# Patient Record
Sex: FEMALE | Race: WHITE | NOT HISPANIC OR LATINO | Employment: FULL TIME | URBAN - NONMETROPOLITAN AREA
[De-identification: names, ages, dates, MRNs, and addresses within clinical notes are randomized per-mention and may not be internally consistent; named-entity substitution may affect disease eponyms.]

---

## 2023-12-04 ENCOUNTER — HOSPITAL ENCOUNTER (OUTPATIENT)
Dept: MRI IMAGING | Facility: HOSPITAL | Age: 28
Discharge: HOME | End: 2023-12-04
Admitting: NURSE PRACTITIONER
Payer: COMMERCIAL

## 2023-12-04 DIAGNOSIS — G43.009 MIGRAINE WITHOUT AURA, NOT INTRACTABLE, WITHOUT STATUS MIGRAINOSUS: ICD-10-CM

## 2023-12-04 PROCEDURE — 70551 MRI BRAIN STEM W/O DYE: CPT

## 2024-01-02 ENCOUNTER — TELEPHONE (OUTPATIENT)
Dept: NEUROLOGY | Facility: CLINIC | Age: 29
End: 2024-01-02
Payer: COMMERCIAL

## 2024-01-03 ENCOUNTER — OFFICE VISIT (OUTPATIENT)
Dept: NEUROLOGY | Facility: CLINIC | Age: 29
End: 2024-01-03
Payer: COMMERCIAL

## 2024-01-03 VITALS
HEIGHT: 63 IN | OXYGEN SATURATION: 97 % | DIASTOLIC BLOOD PRESSURE: 82 MMHG | SYSTOLIC BLOOD PRESSURE: 138 MMHG | WEIGHT: 147 LBS | BODY MASS INDEX: 26.05 KG/M2 | HEART RATE: 93 BPM

## 2024-01-03 DIAGNOSIS — M54.2 NECK PAIN: ICD-10-CM

## 2024-01-03 DIAGNOSIS — G43.109 MIGRAINE WITH AURA AND WITHOUT STATUS MIGRAINOSUS, NOT INTRACTABLE: Primary | ICD-10-CM

## 2024-01-03 DIAGNOSIS — Z87.828 HISTORY OF NECK INJURY: ICD-10-CM

## 2024-01-03 PROCEDURE — 1159F MED LIST DOCD IN RCRD: CPT | Performed by: NURSE PRACTITIONER

## 2024-01-03 PROCEDURE — 1160F RVW MEDS BY RX/DR IN RCRD: CPT | Performed by: NURSE PRACTITIONER

## 2024-01-03 PROCEDURE — 99214 OFFICE O/P EST MOD 30 MIN: CPT | Performed by: NURSE PRACTITIONER

## 2024-01-03 RX ORDER — RIZATRIPTAN BENZOATE 10 MG/1
10 TABLET, ORALLY DISINTEGRATING ORAL AS NEEDED
Qty: 10 TABLET | Refills: 5 | Status: SHIPPED | OUTPATIENT
Start: 2024-01-03

## 2024-01-03 RX ORDER — OMEPRAZOLE 20 MG/1
20 CAPSULE, DELAYED RELEASE ORAL DAILY
COMMUNITY
Start: 2023-10-10

## 2024-01-03 RX ORDER — TOPIRAMATE 50 MG/1
50 TABLET, FILM COATED ORAL 2 TIMES DAILY
Qty: 60 TABLET | Refills: 3 | Status: SHIPPED | OUTPATIENT
Start: 2024-01-03

## 2024-01-03 RX ORDER — DEXTROMETHORPHAN HYDROBROMIDE, BUPROPION HYDROCHLORIDE 105; 45 MG/1; MG/1
1 TABLET, MULTILAYER, EXTENDED RELEASE ORAL NIGHTLY
COMMUNITY
Start: 2023-11-20

## 2024-01-03 RX ORDER — BUSPIRONE HYDROCHLORIDE 15 MG/1
15 TABLET ORAL AS NEEDED
COMMUNITY
Start: 2023-11-30

## 2024-01-03 RX ORDER — RIZATRIPTAN BENZOATE 10 MG/1
10 TABLET, ORALLY DISINTEGRATING ORAL AS NEEDED
COMMUNITY
Start: 2023-10-12 | End: 2024-01-03 | Stop reason: SDUPTHER
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
## 2024-01-17 ENCOUNTER — TELEPHONE (OUTPATIENT)
Dept: NEUROLOGY | Facility: CLINIC | Age: 29
End: 2024-01-17
Payer: COMMERCIAL

## 2024-01-17 NOTE — TELEPHONE ENCOUNTER
Lm on PT's VM letting her know that her insurance is requiring her to complete PT before they will review for authorization. Once PT is complete, Shon can pursue the MRI request, if necessary. I left my direct line for her to call back with any questions or concerns.

## 2024-02-07 DIAGNOSIS — G43.109 MIGRAINE WITH AURA AND WITHOUT STATUS MIGRAINOSUS, NOT INTRACTABLE: Primary | ICD-10-CM

## 2024-02-07 RX ORDER — PROPRANOLOL HYDROCHLORIDE 40 MG/1
40 TABLET ORAL 2 TIMES DAILY
Qty: 30 TABLET | Refills: 3 | Status: SHIPPED | OUTPATIENT
Start: 2024-02-07

## 2024-02-12 ENCOUNTER — TREATMENT (OUTPATIENT)
Dept: PHYSICAL THERAPY | Facility: CLINIC | Age: 29
End: 2024-02-12
Payer: COMMERCIAL

## 2024-02-12 DIAGNOSIS — Z87.828 HISTORY OF NECK INJURY: ICD-10-CM

## 2024-02-12 DIAGNOSIS — M54.2 PAIN, NECK: Primary | ICD-10-CM

## 2024-02-12 PROCEDURE — 97535 SELF CARE MNGMENT TRAINING: CPT

## 2024-02-12 PROCEDURE — 97162 PT EVAL MOD COMPLEX 30 MIN: CPT

## 2024-02-29 ENCOUNTER — TELEPHONE (OUTPATIENT)
Dept: NEUROLOGY | Facility: CLINIC | Age: 29
End: 2024-02-29
Payer: COMMERCIAL

## 2024-02-29 NOTE — TELEPHONE ENCOUNTER
CALLED PATIENT TO LET HER KNOW THAT THE APPOINTMENT SHE HAS WITH NOLBERTO ON 3/6 IS GOING TO HAVE TO BE RESCHEDULED AS HE IS GOING TO HAVE TO BE OUT THAT DAY. I DID LET HER KNOW THAT WE DONT HAVE ANYTHING AVAILABLE UNTIL MID APRIL, SHE STATED SHE DID HAVE SOME CONCERNS ABOUT MEDICATION AS THE ONE HE RECENTLY JUST PRESCRIBED SHE DOESN'T FEEL AS IF IT IS WORKING. I TOLD HER NOLBERTO IS OUT TODAY AND TOMORROW BUT WILL BE BACK MONDAY, SO I CAN ASK HIM ON MONDAY IF HE WOULD PREFER TO SWITCH HER MEDICATION OR IF HE WOULD WANT TO SEE HER FIRST. I TOLD HER I WOULD MAKE HER APPOINTMENT AS OF NOW IN APRIL SO SHE IS ON THE SCHEDULE BUT I WOULD TALK WITH HIM AND LET HER KNOW WHAT HE PREFERS TO DO AND CALL HER BACK TO LET HER KNOW EITHER WAY. SHE VOICED UNDERSTANDING. MOVED PATIENTS APPOINTMENT TO 4/17 @ 1030. PATIENT IS AWARE OF NEW DATE AND TIME.

## 2024-03-08 ENCOUNTER — TELEPHONE (OUTPATIENT)
Dept: NEUROLOGY | Facility: CLINIC | Age: 29
End: 2024-03-08
Payer: COMMERCIAL

## 2024-03-08 NOTE — TELEPHONE ENCOUNTER
CALLED PATIENT BACK FROM PREVIOUS PHONE NOTE. LET HER KNOW NOLBERTO SAID WE COULD TRY AMITRIPTYLINE. SHE SAID THAT THEY HAD PREVIOUSLY TALKED ABOUT IT AND SHE HAD TRIED A FEW ANTIDEPRESSANTS AND WANTED TO STAY AWAY FROM THOSE SO I SPOKE WITH NOLBERTO AND HE SAID WE CAN GO AHEAD AND DO AJOVY. I DID LET HER KNOW. SHE SAID THAT WOULD BE FINE. I TOLD HER TO COME BY ON MONDAY AND I WOULD GET HER SET UP WITH THE SAMPLES AND GO FROM THERE. PATIENT VOICED UNDERSTANDING.

## 2024-04-04 ENCOUNTER — HOSPITAL ENCOUNTER (OUTPATIENT)
Dept: MRI IMAGING | Age: 29
Discharge: HOME | End: 2024-04-04
Payer: MEDICAID

## 2024-04-04 DIAGNOSIS — M23.205: ICD-10-CM

## 2024-04-04 PROCEDURE — 73721 MRI JNT OF LWR EXTRE W/O DYE: CPT

## 2024-04-17 ENCOUNTER — OFFICE VISIT (OUTPATIENT)
Dept: NEUROLOGY | Facility: CLINIC | Age: 29
End: 2024-04-17
Payer: COMMERCIAL

## 2024-04-17 VITALS
HEART RATE: 67 BPM | HEIGHT: 63 IN | WEIGHT: 144 LBS | SYSTOLIC BLOOD PRESSURE: 116 MMHG | OXYGEN SATURATION: 99 % | DIASTOLIC BLOOD PRESSURE: 62 MMHG | BODY MASS INDEX: 25.52 KG/M2

## 2024-04-17 DIAGNOSIS — M54.2 NECK PAIN: ICD-10-CM

## 2024-04-17 DIAGNOSIS — G43.109 MIGRAINE WITH AURA AND WITHOUT STATUS MIGRAINOSUS, NOT INTRACTABLE: Primary | ICD-10-CM

## 2024-04-17 PROCEDURE — 1159F MED LIST DOCD IN RCRD: CPT | Performed by: NURSE PRACTITIONER

## 2024-04-17 PROCEDURE — 99214 OFFICE O/P EST MOD 30 MIN: CPT | Performed by: NURSE PRACTITIONER

## 2024-04-17 PROCEDURE — 1160F RVW MEDS BY RX/DR IN RCRD: CPT | Performed by: NURSE PRACTITIONER

## 2024-04-17 RX ORDER — BUPROPION HYDROCHLORIDE 150 MG/1
150 TABLET ORAL EVERY MORNING
COMMUNITY
Start: 2024-04-15

## 2024-04-17 NOTE — ASSESSMENT & PLAN NOTE
She had no success with use of Topamax or propranolol as she had significant side effects with these.  We did start her on Ajovy and gave her a sample.  She noted significant improvements with this.  At this time I believe that we should continue Ajovy and we will send an order in for this.  Will provide another sample today for Ajovy.  She is to call me with any other questions or concerns.  She is to continue Maxalt 10 mg as needed for migraine onset.

## 2024-04-17 NOTE — PROGRESS NOTES
Neurology Progress Note    Cheif Complaint:    Migraine  Neck Pain    Subjective   History of Present Illness:  Tamara ARCOS is a 28 y.o. female who presents today for migraine and neck pain.  She is routinely followed by Meagan Calixto APRN for primary care.     Migraine  We attempted her on Topamax at her last visit but unfortunately she had significant side effects with this and we then switched her to propranolol.  After some short time with that she started to note some dizziness and faintness for which we then switched her to Ajovy.  She noted some improvement with this but was unable to get refills of this.  She reports that for couple weeks she had essentially no migraines.  This was the first time in quite some time she has not had any migraine headaches.  She is back to having migraines at nearly daily at this time.  She denies any change in the character or quality of her migraines.    Neck Pain  She is able to go to a few of the therapy sessions and send a helped her with some exercises and she notes she is continue to do these noting some mild improvements but due to some other personal issues she has not been able to consistently go to physical therapy at this time.    Allergies:    Peanut-containing drug products    Medications:  Current Outpatient Medications   Medication Sig Dispense Refill    buPROPion XL (WELLBUTRIN XL) 150 MG 24 hr tablet Take 1 tablet by mouth Every Morning.      LaMICtal 100 MG tablet       omeprazole (priLOSEC) 20 MG capsule Take 1 capsule by mouth Daily.      propranolol (INDERAL) 40 MG tablet Take 1 tablet by mouth 2 (Two) Times a Day. 30 tablet 3    rizatriptan MLT (MAXALT-MLT) 10 MG disintegrating tablet Place 1 tablet on the tongue As Needed for Migraine. 10 tablet 5    Fremanezumab-vfrm 225 MG/1.5ML solution auto-injector Inject 225 mg under the skin into the appropriate area as directed Every 30 (Thirty) Days. 1.68 mL 11     No current facility-administered  "medications for this visit.     Current outpatient and discharge medications have been reconciled for the patient.  Reviewed by: KRISTEN Fernandez    Past Medical History:  Past Medical History:   Diagnosis Date    Anxiety     BV (bacterial vaginosis)     Chlamydia     Contraception management     Depression     DUB (dysfunctional uterine bleeding)     Exposure to STD     Migraines     Nausea & vomiting     Ovarian cyst     Screen for STD (sexually transmitted disease)     Supervision of normal first pregnancy     Tobacco use     UTI (urinary tract infection)     Vaginitis     Vaginitis due to Candida      Past Surgical History:   Procedure Laterality Date    ADENOIDECTOMY      KNEE SURGERY Right     PATELLA OPEN REDUCTION INTERNAL FIXATION Right 2017    Procedure: Irrigation and debridement of right knee - removal of contamination, possible arthrotomy (c-arm);  Surgeon: Tello Davenport MD;  Location:  [facility];  Service:     SALPINGECTOMY Bilateral 2022    Procedure: SALPINGECTOMY LAPAROSCOPIC;  Surgeon: Chelsey Vicente DO;  Location:  [facility];  Service: Obstetrics/Gynecology;  Laterality: Bilateral;    TONSILLECTOMY      WISDOM TOOTH EXTRACTION      WRIST SURGERY Right      Family History   Problem Relation Age of Onset    Hypertension Mother     Coronary artery disease Father      Social History     Tobacco Use    Smoking status: Former     Current packs/day: 0.00     Types: Cigarettes     Quit date:      Years since quittin.2    Smokeless tobacco: Never    Tobacco comments:     QUIT 1 MONTH SMOKING   Vaping Use    Vaping status: Former    Devices: vaping   Substance Use Topics    Alcohol use: Not Currently    Drug use: No     Review of Systems   Musculoskeletal:  Positive for neck pain.   Neurological:  Positive for headache.         Objective   Vital Signs:  Heart Rate:  [67] 67  BP: (116)/(62) 116/62      24  1046   Weight: 65.3 kg (144 lb)     160 cm (63\")  Body mass " index is 25.51 kg/m².    Physical Exam  Vitals reviewed.   Constitutional:       Appearance: Normal appearance.   HENT:      Head: Normocephalic.      Mouth/Throat:      Pharynx: Oropharynx is clear.   Eyes:      General: Lids are normal.      Extraocular Movements: Extraocular movements intact.      Pupils: Pupils are equal, round, and reactive to light.   Cardiovascular:      Rate and Rhythm: Normal rate and regular rhythm.      Pulses: Normal pulses.   Pulmonary:      Effort: Pulmonary effort is normal.   Musculoskeletal:      Cervical back: Neck supple. Pain with movement present. Decreased range of motion.   Skin:     General: Skin is warm and dry.      Capillary Refill: Capillary refill takes less than 2 seconds.   Neurological:      Motor: Motor strength is normal.     Coordination: Coordination is intact.      Deep Tendon Reflexes: Reflexes are normal and symmetric.   Psychiatric:         Mood and Affect: Mood normal.         Speech: Speech normal.       Neurological Exam  Mental Status  Awake, alert and oriented to person, place and time. Recent and remote memory are intact. Speech is normal. Language is fluent with no aphasia. Attention and concentration are normal.    Cranial Nerves  CN II: Visual acuity is normal. Visual fields full to confrontation.  CN III, IV, VI: Extraocular movements intact bilaterally. Normal lids and orbits bilaterally. Pupils equal round and reactive to light bilaterally.  CN V: Facial sensation is normal.  CN VII: Full and symmetric facial movement.  CN IX, X: Palate elevates symmetrically. Normal gag reflex.  CN XI: Shoulder shrug strength is normal.  CN XII: Tongue midline without atrophy or fasciculations.    Motor   Strength is 5/5 throughout all four extremities.    Sensory  Sensation is intact to light touch, pinprick, vibration and proprioception in all four extremities.    Reflexes  Deep tendon reflexes are 2+ and symmetric in all four  "extremities.    Coordination    Finger-to-nose, rapid alternating movements and heel-to-shin normal bilaterally without dysmetria.    Gait  Normal casual, toe, heel and tandem gait.    Results Review:    Lab Results   Component Value Date    GLUCOSE 117 (H) 04/28/2022    BUN 7 04/28/2022    CREATININE 0.70 11/03/2022    EGFRIFNONA >150 10/20/2021    BCR 17.1 04/28/2022    K 3.9 04/28/2022    CO2 19.0 (L) 04/28/2022    CALCIUM 9.2 04/28/2022    ALBUMIN 3.70 04/28/2022    AST 18 04/28/2022    ALT 14 04/28/2022     Lab Results   Component Value Date    WBC 6.99 06/17/2022    HGB 12.7 06/17/2022    HCT 39.6 06/17/2022    MCV 81.5 06/17/2022     06/17/2022     No results found for: \"CHOL\", \"CHLPL\", \"TRIG\", \"HDL\", \"LDL\", \"LDLDIRECT\"  No results found for: \"TSH\"  No results found for: \"HGBA1C\"  No results found for: \"FOLATE\"  No results found for: \"[id number]\"    MRI Brain Without Contrast (12/04/2023 12:00)      Plan   Diagnoses and all orders for this visit:    1. Migraine with aura and without status migrainosus, not intractable (Primary)  Assessment & Plan:  She had no success with use of Topamax or propranolol as she had significant side effects with these.  We did start her on Ajovy and gave her a sample.  She noted significant improvements with this.  At this time I believe that we should continue Ajovy and we will send an order in for this.  Will provide another sample today for Ajovy.  She is to call me with any other questions or concerns.  She is to continue Maxalt 10 mg as needed for migraine onset.    Orders:  -     Fremanezumab-vfrm 225 MG/1.5ML solution auto-injector; Inject 225 mg under the skin into the appropriate area as directed Every 30 (Thirty) Days.  Dispense: 1.68 mL; Refill: 11    2. Neck pain  Assessment & Plan:  She notes some improvement with the stretches from physical therapy.  She is unable to continue with physical therapy at this time and we will defer further management of this.  If " at any point she were to wish to return I will be more than happy to replace orders for this.  She is understanding and agreeable.      Follow-Up:  Return in about 3 months (around 7/17/2024) for Migraine.         KRISTEN Fernandez  04/17/24  11:14 CDT

## 2024-04-17 NOTE — ASSESSMENT & PLAN NOTE
She notes some improvement with the stretches from physical therapy.  She is unable to continue with physical therapy at this time and we will defer further management of this.  If at any point she were to wish to return I will be more than happy to replace orders for this.  She is understanding and agreeable.

## 2024-04-22 PROCEDURE — 87801 DETECT AGNT MULT DNA AMPLI: CPT | Performed by: NURSE PRACTITIONER

## 2024-04-22 PROCEDURE — 87591 N.GONORRHOEAE DNA AMP PROB: CPT | Performed by: NURSE PRACTITIONER

## 2024-04-22 PROCEDURE — 87661 TRICHOMONAS VAGINALIS AMPLIF: CPT | Performed by: NURSE PRACTITIONER

## 2024-04-22 PROCEDURE — 87086 URINE CULTURE/COLONY COUNT: CPT | Performed by: NURSE PRACTITIONER

## 2024-04-22 PROCEDURE — 87186 SC STD MICRODIL/AGAR DIL: CPT | Performed by: NURSE PRACTITIONER

## 2024-04-22 PROCEDURE — 87798 DETECT AGENT NOS DNA AMP: CPT | Performed by: NURSE PRACTITIONER

## 2024-04-22 PROCEDURE — 87088 URINE BACTERIA CULTURE: CPT | Performed by: NURSE PRACTITIONER

## 2024-04-22 PROCEDURE — 87491 CHLMYD TRACH DNA AMP PROBE: CPT | Performed by: NURSE PRACTITIONER

## 2024-05-08 ENCOUNTER — HOSPITAL ENCOUNTER (EMERGENCY)
Facility: HOSPITAL | Age: 29
Discharge: HOME | End: 2024-05-09
Payer: COMMERCIAL

## 2024-05-08 DIAGNOSIS — R51.9 ACUTE NONINTRACTABLE HEADACHE, UNSPECIFIED HEADACHE TYPE: Primary | ICD-10-CM

## 2024-05-08 LAB
HOLD SPECIMEN: NORMAL
WHOLE BLOOD HOLD COAG: NORMAL
WHOLE BLOOD HOLD SPECIMEN: NORMAL

## 2024-05-08 PROCEDURE — 25010000002 DIPHENHYDRAMINE PER 50 MG

## 2024-05-08 PROCEDURE — 96374 THER/PROPH/DIAG INJ IV PUSH: CPT

## 2024-05-08 PROCEDURE — 25010000002 DEXAMETHASONE PER 1 MG

## 2024-05-08 PROCEDURE — 25010000002 PROCHLORPERAZINE 10 MG/2ML SOLUTION

## 2024-05-08 PROCEDURE — 99283 EMERGENCY DEPT VISIT LOW MDM: CPT

## 2024-05-08 PROCEDURE — 25010000002 KETOROLAC TROMETHAMINE PER 15 MG

## 2024-05-08 PROCEDURE — 96375 TX/PRO/DX INJ NEW DRUG ADDON: CPT

## 2024-05-08 PROCEDURE — 25810000003 SODIUM CHLORIDE 0.9 % SOLUTION

## 2024-05-08 RX ORDER — DIPHENHYDRAMINE HYDROCHLORIDE 50 MG/ML
25 INJECTION INTRAMUSCULAR; INTRAVENOUS ONCE
Status: COMPLETED | OUTPATIENT
Start: 2024-05-08 | End: 2024-05-08

## 2024-05-08 RX ORDER — KETOROLAC TROMETHAMINE 15 MG/ML
15 INJECTION, SOLUTION INTRAMUSCULAR; INTRAVENOUS ONCE
Status: COMPLETED | OUTPATIENT
Start: 2024-05-08 | End: 2024-05-08

## 2024-05-08 RX ORDER — SODIUM CHLORIDE 0.9 % (FLUSH) 0.9 %
10 SYRINGE (ML) INJECTION AS NEEDED
Status: DISCONTINUED | OUTPATIENT
Start: 2024-05-08 | End: 2024-05-09 | Stop reason: HOSPADM

## 2024-05-08 RX ORDER — DEXAMETHASONE SODIUM PHOSPHATE 10 MG/ML
10 INJECTION INTRAMUSCULAR; INTRAVENOUS ONCE
Status: COMPLETED | OUTPATIENT
Start: 2024-05-08 | End: 2024-05-08

## 2024-05-08 RX ORDER — PROCHLORPERAZINE EDISYLATE 5 MG/ML
10 INJECTION INTRAMUSCULAR; INTRAVENOUS ONCE
Status: COMPLETED | OUTPATIENT
Start: 2024-05-08 | End: 2024-05-08

## 2024-05-08 RX ADMIN — PROCHLORPERAZINE EDISYLATE 10 MG: 5 INJECTION INTRAMUSCULAR; INTRAVENOUS at 22:40

## 2024-05-08 RX ADMIN — SODIUM CHLORIDE 1000 ML: 9 INJECTION, SOLUTION INTRAVENOUS at 22:45

## 2024-05-08 RX ADMIN — DEXAMETHASONE SODIUM PHOSPHATE 10 MG: 10 INJECTION INTRAMUSCULAR; INTRAVENOUS at 23:29

## 2024-05-08 RX ADMIN — DIPHENHYDRAMINE HYDROCHLORIDE 25 MG: 50 INJECTION, SOLUTION INTRAMUSCULAR; INTRAVENOUS at 22:41

## 2024-05-08 RX ADMIN — KETOROLAC TROMETHAMINE 15 MG: 15 INJECTION, SOLUTION INTRAMUSCULAR; INTRAVENOUS at 22:40

## 2024-05-08 NOTE — Clinical Note
[facility] EMERGENCY DEPARTMENT  [address]  [facility] [address]  Phone: [phone]    Tamara ARCOS was seen and treated in our emergency department on 5/8/2024.  She may return to work on 05/13/2024.         Thank you for choosing [facility].    Parker Palafox, APRN

## 2024-05-09 VITALS
DIASTOLIC BLOOD PRESSURE: 77 MMHG | BODY MASS INDEX: 26.05 KG/M2 | SYSTOLIC BLOOD PRESSURE: 102 MMHG | RESPIRATION RATE: 20 BRPM | HEIGHT: 63 IN | HEART RATE: 74 BPM | WEIGHT: 147 LBS | OXYGEN SATURATION: 98 % | TEMPERATURE: 97.8 F

## 2024-05-09 NOTE — DISCHARGE INSTRUCTIONS
It was very nice to meet you, Tamara. Thank you for allowing us to take care of you today at [facility].    Today you were seen in the emergency department for your symptoms. Please understand that an ER evaluation is just the start of your evaluation. We do the best we can, but we are often unable to fully find what is causing your symptoms from one evaluation.  Because of this, the goal is to determine whether you need to be evaluated in the hospital or if it is safe for you to go home and see other doctors provided such as primary care physicians or specialist on an outpatient basis.     Like we discussed, I strongly urge that you follow up with your primary care doctor. Please call their office to set up an appointment as soon as possible so that you can be re-evaluated for improvement in your symptoms or for any other questions.  I have provided the information needed, including phone number, to call to set up an appointment below in these discharge papers.     Educational material has also been provided in the following pages regarding what we have discussed today.     Please return to the emergency room within 12-48 hours if you experience symptoms such as the following:   Fever, chills, chest pain or shortness of breath, pain with inspiration/expiration, pain that travels to your arms, neck or back, nausea, vomiting, severe headache, tearing pain in your chest, dizziness, feel as though you are about to pass out, OR if you have any worsening symptoms, or any other concerns.

## 2024-05-09 NOTE — ED PROVIDER NOTES
Subjective   History of Present Illness  Patient is a 28-year-old female that presents to the emergency department for complaints of migraine.  Patient reports that she has a history of migraines and is followed by KRISTEN Bettencourt with neurology.  Patient reports she does Ajovy shots in addition to Maxalt for treatment of migraines.  She states that she has been out of Maxalt and reports next injection of Ajovy is due next week.  Patient states she has been trying over-the-counter medications with no relief in symptoms.  She states that this migraine presented no differently than any other migraine she just does not have the proper medication to treat said migraine.  She states that she has a an aura of bright colors with headache to the right side of head with blurred vision and nausea.  She states all of these symptoms are consistent with usual migraine.  Denies any fevers, body aches, chills, cough, or congestion.  Denies any chest pain, shortness of breath, abdominal pain, nausea, vomiting, constipation, or diarrhea. Denies any lightheadedness, dizziness, palpitations or near syncope. Denies any chance of pregnancy.          Review of Systems   Eyes:  Positive for visual disturbance.   Gastrointestinal:  Positive for nausea.   Neurological:  Positive for headaches.   All other systems reviewed and are negative.      Past Medical History:   Diagnosis Date    Anxiety     BV (bacterial vaginosis)     Chlamydia     Contraception management     Depression     DUB (dysfunctional uterine bleeding)     Exposure to STD     Migraines     Nausea & vomiting     Ovarian cyst     Screen for STD (sexually transmitted disease)     Supervision of normal first pregnancy     Tobacco use     UTI (urinary tract infection)     Vaginitis     Vaginitis due to Candida        Allergies   Allergen Reactions    Peanut-Containing Drug Products Swelling       Past Surgical History:   Procedure Laterality Date    ADENOIDECTOMY      KNEE  SURGERY Right     PATELLA OPEN REDUCTION INTERNAL FIXATION Right 2017    Procedure: Irrigation and debridement of right knee - removal of contamination, possible arthrotomy (c-arm);  Surgeon: Tello Davenport MD;  Location:  [facility] OR;  Service:     SALPINGECTOMY Bilateral 2022    Procedure: SALPINGECTOMY LAPAROSCOPIC;  Surgeon: Chelsey Vicente DO;  Location:  [facility] OR;  Service: Obstetrics/Gynecology;  Laterality: Bilateral;    TONSILLECTOMY      WISDOM TOOTH EXTRACTION      WRIST SURGERY Right        Family History   Problem Relation Age of Onset    Hypertension Mother     Coronary artery disease Father        Social History     Socioeconomic History    Marital status:    Tobacco Use    Smoking status: Former     Current packs/day: 0.00     Types: Cigarettes     Quit date:      Years since quittin.3    Smokeless tobacco: Never    Tobacco comments:     QUIT 1 MONTH SMOKING   Vaping Use    Vaping status: Former    Devices: vaping   Substance and Sexual Activity    Alcohol use: Not Currently    Drug use: No    Sexual activity: Defer     Partners: Male     Birth control/protection: None           Objective   Physical Exam  Vitals and nursing note reviewed.   Constitutional:       Appearance: Normal appearance.      Comments: Nontoxic appearing. In no acute distress.    HENT:      Head: Normocephalic and atraumatic.      Right Ear: External ear normal.      Left Ear: External ear normal.      Nose: Nose normal.      Mouth/Throat:      Mouth: Mucous membranes are moist.      Pharynx: Oropharynx is clear.   Eyes:      Extraocular Movements: Extraocular movements intact.      Conjunctiva/sclera: Conjunctivae normal.      Pupils: Pupils are equal, round, and reactive to light.   Cardiovascular:      Rate and Rhythm: Normal rate and regular rhythm.      Pulses: Normal pulses.      Heart sounds: Normal heart sounds.   Pulmonary:      Effort: Pulmonary effort is normal. No respiratory  distress.      Breath sounds: Normal breath sounds. No wheezing.   Chest:      Chest wall: No tenderness.   Abdominal:      General: Abdomen is flat. Bowel sounds are normal. There is no distension.      Palpations: Abdomen is soft.      Tenderness: There is no abdominal tenderness. There is no right CVA tenderness, left CVA tenderness, guarding or rebound.   Musculoskeletal:         General: Normal range of motion.      Cervical back: Normal range of motion and neck supple.      Right lower leg: No edema.      Left lower leg: No edema.   Skin:     General: Skin is warm and dry.      Capillary Refill: Capillary refill takes less than 2 seconds.   Neurological:      General: No focal deficit present.      Mental Status: She is alert and oriented to person, place, and time. Mental status is at baseline.      Cranial Nerves: Cranial nerves 2-12 are intact.      Sensory: Sensation is intact.      Motor: Motor function is intact.      Coordination: Coordination is intact.      Gait: Gait is intact.      Deep Tendon Reflexes: Reflexes are normal and symmetric.   Psychiatric:         Attention and Perception: Attention and perception normal.         Mood and Affect: Mood and affect normal.         Speech: Speech normal.         Behavior: Behavior normal. Behavior is cooperative.         Thought Content: Thought content normal.         Cognition and Memory: Cognition and memory normal.         Judgment: Judgment normal.         Procedures           ED Course                                             Medical Decision Making  Tamara ARCOS is a 28 y.o. female who presents to the ED for complaints of migraine.  Patient reports that she has a history of migraines and is followed by KRISTEN Bettencourt with neurology.  Patient reports she does Ajovy shots in addition to Maxalt for treatment of migraines.  She states that she has been out of Maxalt and reports next injection of Ajovy is due next week.  Patient states she has  been trying over-the-counter medications with no relief in symptoms.  She states that this migraine presented no differently than any other migraine she just does not have the proper medication to treat said migraine.  She states that she has a an aura of bright colors with headache to the right side of head with blurred vision and nausea.  She states all of these symptoms are consistent with usual migraine.  Denies any fevers, body aches, chills, cough, or congestion.  Denies any chest pain, shortness of breath, abdominal pain, nausea, vomiting, constipation, or diarrhea. Denies any lightheadedness, dizziness, palpitations or near syncope. Denies any chance of pregnancy.    Patient was non-toxic appearing on arrival. No acute distress was noted.  Vital signs stable.     Past medical history, surgical history, and medication regimen reviewed.     Previous notes, labs, imaging, and more reviewed.    Patient's presentation raises suspicion for differentials including, but not limited to, migraine, tension headache, sinus headache.    Medications administered,   sodium chloride 0.9 % flush 10 mL (has no administration in time range)  sodium chloride 0.9 % bolus 1,000 mL (0 mL Intravenous Stopped 5/9/24 0035)  prochlorperazine (COMPAZINE) injection 10 mg (10 mg Intravenous Given 5/8/24 2240)  diphenhydrAMINE (BENADRYL) injection 25 mg (25 mg Intravenous Given 5/8/24 2241)  ketorolac (TORADOL) injection 15 mg (15 mg Intravenous Given 5/8/24 2240)  dexAMETHasone (DECADRON) injection 10 mg (10 mg Intravenous Given 5/8/24 2329)     Upon reevaluation the patient she reports relief in symptoms and is feeling much better following medication administration.    Given findings described above, patient's presentation is likely consistent with migraine.      I had an in-depth discussion with the patient as well as friend present at bedside regarding physical exam findings.  Discussed that patient will need to follow-up with  neurology and PCP.  Patient was educated on concerning signs and symptoms that would warrant a quick return to the ED and verbalized understanding of this. I answered all the questions regarding the emergency department evaluation, diagnosis, and treatment plan in plain and simple language that was understandable. We discussed that due to always having some diagnostic uncertainty while in the ER, there is always a chance that symptoms may change or new symptoms may reveal themselves after being discharged. Because of this, I stressed the importance of Tamara following up with their PCP. Patient informed that appointment will need to be done by calling their office to set up an appointment within the next few days or as soon as reasonably possible so that the symptoms can be re-evaluated for improvement or for any other questions. I also gave Tamara common sense return precautions and prompted patient to return to the emergency department within 24 - 48hrs if there are any new, worsening, or concerning symptoms. The patient verbalized understanding of the discharge instructions and agreed with them. Tamara was discharged in stable condition.     Dragon disclaimer:  Parts of this note may be an electronic transcription/translation of spoken language to printed text using the Dragon dictation system.       Problems Addressed:  Acute nonintractable headache, unspecified headache type: complicated acute illness or injury    Risk  Prescription drug management.        Final diagnoses:   Acute nonintractable headache, unspecified headache type       ED Disposition  ED Disposition       ED Disposition   Discharge    Condition   Stable    Comment   --               Meagan Calixto, APRN  [address] [facility] [address]  [phone]    Schedule an appointment as soon as possible for a visit       [facility] EMERGENCY DEPARTMENT  [address] [place] [id number] [phone]    If symptoms  worsen         Medication List      No changes were made to your prescriptions during this visit.            Parker Palafox, APRN  05/09/24 0047

## 2024-05-16 ENCOUNTER — TELEPHONE (OUTPATIENT)
Dept: NEUROLOGY | Facility: CLINIC | Age: 29
End: 2024-05-16
Payer: COMMERCIAL

## 2024-05-16 NOTE — TELEPHONE ENCOUNTER
CALLED PATIENT TO LET HER KNOW THAT I DID THE AUTHORIZATION AND I GOT THE APPROVAL. PATIENT VOICED UNDERSTANDING.

## 2024-05-16 NOTE — TELEPHONE ENCOUNTER
Caller: Tamara ARCOS    Relationship: Self    Best call back number: [phone]    What was the call regarding: PT CALLING TO NOTIFY THE OFFICE THAT HER INSURANCE IS REQUIRING A PRIOR AUTHORIZATION FOR FURTHER CONSIDERATION OF COVERAGE OF THE AJOVY MEDICATION. PT CALLING TO REQUEST THAT OFFICE INITIATE PRIOR AUTHORIZATION AND CONTACT HER WITH ANY STATUS UPDATES.    PT STATES SHE IS CURRENTLY DUE FOR HER NEXT INJECTION TODAY, 5/16/24.    Do you require a callback: YES, PLEASE.    PLEASE REVIEW AND ADVISE.

## 2024-07-18 ENCOUNTER — OFFICE VISIT (OUTPATIENT)
Age: 29
End: 2024-07-18
Payer: MEDICAID

## 2024-07-18 VITALS
HEART RATE: 82 BPM | DIASTOLIC BLOOD PRESSURE: 68 MMHG | SYSTOLIC BLOOD PRESSURE: 116 MMHG | OXYGEN SATURATION: 95 % | WEIGHT: 142 LBS | BODY MASS INDEX: 22.92 KG/M2 | TEMPERATURE: 97.1 F | RESPIRATION RATE: 20 BRPM

## 2024-07-18 DIAGNOSIS — B00.1 COLD SORE: Primary | ICD-10-CM

## 2024-07-18 PROCEDURE — 99203 OFFICE O/P NEW LOW 30 MIN: CPT | Performed by: NURSE PRACTITIONER

## 2024-07-18 PROCEDURE — 4004F PT TOBACCO SCREEN RCVD TLK: CPT | Performed by: NURSE PRACTITIONER

## 2024-07-18 PROCEDURE — G8428 CUR MEDS NOT DOCUMENT: HCPCS | Performed by: NURSE PRACTITIONER

## 2024-07-18 PROCEDURE — G8420 CALC BMI NORM PARAMETERS: HCPCS | Performed by: NURSE PRACTITIONER

## 2024-07-18 RX ORDER — BUPROPION HYDROCHLORIDE 150 MG/1
TABLET ORAL
COMMUNITY
Start: 2024-06-27

## 2024-07-18 RX ORDER — MELOXICAM 15 MG/1
TABLET ORAL
COMMUNITY
Start: 2024-07-09

## 2024-07-18 RX ORDER — LAMOTRIGINE 25 MG/1
25 TABLET ORAL DAILY
COMMUNITY

## 2024-07-18 RX ORDER — VALACYCLOVIR HYDROCHLORIDE 1 G/1
1000 TABLET, FILM COATED ORAL 3 TIMES DAILY
Qty: 21 TABLET | Refills: 0 | Status: SHIPPED | OUTPATIENT
Start: 2024-07-18 | End: 2024-07-25

## 2024-07-18 ASSESSMENT — ENCOUNTER SYMPTOMS
STRIDOR: 0
CHEST TIGHTNESS: 0
SINUS PRESSURE: 0
EYE PAIN: 0
COUGH: 0
SORE THROAT: 0
TROUBLE SWALLOWING: 0
WHEEZING: 0
ABDOMINAL PAIN: 0
EYE DISCHARGE: 0
COLOR CHANGE: 0
ABDOMINAL DISTENTION: 0
SHORTNESS OF BREATH: 0

## 2024-07-18 NOTE — PATIENT INSTRUCTIONS
Valtrex sent  Culture collected  Follow-up with PCP as needed  Go to ER for worrisome symptoms      Patient verbalized understanding and agrees to plan.

## 2024-07-18 NOTE — PROGRESS NOTES
sodium (COLACE) 100 MG capsule Take 1 capsule by mouth 2 times daily (Patient not taking: Reported on 7/18/2024) 60 capsule 1    citalopram (CELEXA) 20 MG tablet Take 1 tablet by mouth daily (Patient not taking: Reported on 7/18/2024) 30 tablet 3    busPIRone (BUSPAR) 10 MG tablet Take 1 tablet by mouth 3 times daily (Patient not taking: Reported on 7/18/2024) 90 tablet 3     No current facility-administered medications for this visit.       Allergies   Allergen Reactions    Peanut-Containing Drug Products Swelling       Health Maintenance   Topic Date Due    Hepatitis B vaccine (1 of 3 - 3-dose series) Never done    COVID-19 Vaccine (1) Never done    Varicella vaccine (1 of 2 - 2-dose childhood series) Never done    HPV vaccine (1 - 2-dose series) Never done    Depression Monitoring  Never done    HIV screen  Never done    Hepatitis C screen  Never done    Pap smear  Never done    Pneumococcal 0-64 years Vaccine (2 of 2 - PCV) 03/25/2022    Flu vaccine (1) 08/01/2024    DTaP/Tdap/Td vaccine (4 - Td or Tdap) 03/04/2032    Hepatitis A vaccine  Aged Out    Hib vaccine  Aged Out    Polio vaccine  Aged Out    Meningococcal (ACWY) vaccine  Aged Out       Subjective:   Review of Systems   Constitutional:  Negative for chills, fatigue and fever.   HENT:  Negative for congestion, sinus pressure, sore throat and trouble swallowing.    Eyes:  Negative for pain and discharge.   Respiratory:  Negative for cough, chest tightness, shortness of breath, wheezing and stridor.    Cardiovascular:  Negative for chest pain and palpitations.   Gastrointestinal:  Negative for abdominal distention and abdominal pain.   Genitourinary:  Negative for difficulty urinating, dysuria and hematuria.   Musculoskeletal:  Negative for arthralgias, neck pain and neck stiffness.   Skin:  Positive for wound. Negative for color change and rash.   Neurological:  Negative for dizziness, syncope, speech difficulty, weakness and numbness.

## 2024-07-21 LAB
FINAL REPORT: NORMAL
PRELIMINARY: NORMAL

## 2024-11-09 ENCOUNTER — OFFICE VISIT (OUTPATIENT)
Age: 29
End: 2024-11-09
Payer: COMMERCIAL

## 2024-11-09 VITALS
TEMPERATURE: 97.8 F | HEIGHT: 63 IN | HEART RATE: 105 BPM | WEIGHT: 146 LBS | SYSTOLIC BLOOD PRESSURE: 128 MMHG | OXYGEN SATURATION: 99 % | DIASTOLIC BLOOD PRESSURE: 76 MMHG | BODY MASS INDEX: 25.87 KG/M2 | RESPIRATION RATE: 18 BRPM

## 2024-11-09 DIAGNOSIS — Z20.828 EXPOSURE TO HERPES SIMPLEX VIRUS (HSV): ICD-10-CM

## 2024-11-09 DIAGNOSIS — N76.6 GENITAL ULCER, FEMALE: Primary | ICD-10-CM

## 2024-11-09 PROCEDURE — 99213 OFFICE O/P EST LOW 20 MIN: CPT

## 2024-11-09 ASSESSMENT — ENCOUNTER SYMPTOMS
SINUS PRESSURE: 0
EYE DISCHARGE: 0
CONSTIPATION: 0
EYE ITCHING: 0
VOMITING: 0
RHINORRHEA: 0
NAUSEA: 0
WHEEZING: 0
SHORTNESS OF BREATH: 0
BLOOD IN STOOL: 0
ABDOMINAL PAIN: 0
COUGH: 0
SORE THROAT: 0
COLOR CHANGE: 0
DIARRHEA: 0

## 2024-11-09 NOTE — PATIENT INSTRUCTIONS
-Sending swab culture and blood test for HSV. Unsure if swab will be sufficient since there isn't any drainage.  -Refrain from sexual intercourse until results are obtained and treatment is completed (if treatment is necessary)  -If testing is positive, you and your partner(s) should be treated  -The patient is to follow up with PCP or return to clinic if symptoms worsen/fail to improve.     Any condition can change, despite proper treatment. Therefore, if symptoms still persist or worsen after treatment plan intitated today, either go to the nearest ER, or call PCP, or return to UC for further evaluation.    Urgent Care evaluation today is not a substitute for PCP visit. Follow up care is your responsibility to discuss and review this UC visit.     Discussed use, benefits, and side effects of any prescribed medications. All patient questions were answered. Patient demonstrates understanding and agrees with care plan. Patient was given educational materials - see patient instructions below

## 2024-11-09 NOTE — PROGRESS NOTES
[facility]  [facility]  [address] [place] [address] DRIVE  [place] [address]  Dept: [phone]  Dept Fax: [phone]  Loc: [phone]    Nhung German is a 29 y.o. female who presents today for her medical conditions/complaints as noted below.  Nhung German is complaining of Exposure to STD (Exposed to genital herpes)        HPI:   Exposure to STD   The patient's primary symptoms include genital lesions. The patient's pertinent negatives include no discharge, dyspareunia, dysuria, genital itching, genital rash or pelvic pain. This is a new problem. The current episode started yesterday. The problem has been unchanged. Pertinent negatives include no abdominal pain, fever, genital odor or sore throat.     Patient's partner has HSV, they use protection during sexual intercourse. Patient noticed red swollen area on vagina, thought it was an ingrown hair. She accidentally cut this area shaving, noticed some clear to yellow drainage and small amount of blood. Denies any burning or tingling, no body aches or fever.     Past Medical History:   Diagnosis Date    Anxiety     Depression     Substance abuse (HCC)        Past Surgical History:   Procedure Laterality Date    CAST APPLICATION Left 10/13/2017    LEFT LOWER SHORT LEG SPLINT performed by Sheldon Storey MD at [facility] OR    KNEE SURGERY      SC OPEN TREATMENT RADIAL SHAFT FRACTURE Right 10/13/2017    RADIUS OPEN REDUCTION INTERNAL FIXATION performed by Sheldon Storey MD at [facility] OR    TONSILLECTOMY AND ADENOIDECTOMY         History reviewed. No pertinent family history.    Social History     Tobacco Use    Smoking status: Former     Current packs/day: 0.50     Types: Cigarettes    Smokeless tobacco: Never   Substance Use Topics    Alcohol use: No        Current Outpatient Medications   Medication Sig Dispense Refill    buPROPion (WELLBUTRIN XL) 150 MG extended release tablet       meloxicam (MOBIC) 15 MG tablet       lamoTRIgine

## 2024-11-12 LAB
HSV1 GG IGG SER-ACNC: 22 IV
HSV2 GG IGG SER-ACNC: 0.06 IV

## 2024-11-13 LAB
FINAL REPORT: NORMAL
PRELIMINARY: NORMAL

## 2024-12-05 ENCOUNTER — HOSPITAL ENCOUNTER (EMERGENCY)
Age: 29
Discharge: HOME | End: 2024-12-05
Attending: EMERGENCY MEDICINE
Payer: COMMERCIAL

## 2024-12-05 VITALS
TEMPERATURE: 97.7 F | HEART RATE: 94 BPM | OXYGEN SATURATION: 100 % | SYSTOLIC BLOOD PRESSURE: 144 MMHG | DIASTOLIC BLOOD PRESSURE: 108 MMHG | RESPIRATION RATE: 18 BRPM

## 2024-12-05 DIAGNOSIS — R19.7 NAUSEA VOMITING AND DIARRHEA: Primary | ICD-10-CM

## 2024-12-05 DIAGNOSIS — R11.2 NAUSEA VOMITING AND DIARRHEA: Primary | ICD-10-CM

## 2024-12-05 LAB
ALBUMIN SERPL-MCNC: 4.6 G/DL (ref 3.5–5.2)
ALP SERPL-CCNC: 68 U/L (ref 35–104)
ALT SERPL-CCNC: 9 U/L (ref 5–33)
ANION GAP SERPL CALCULATED.3IONS-SCNC: 9 MMOL/L (ref 7–19)
AST SERPL-CCNC: 13 U/L (ref 5–32)
BASOPHILS # BLD: 0 K/UL (ref 0–0.2)
BASOPHILS NFR BLD: 0.2 % (ref 0–1)
BILIRUB SERPL-MCNC: 0.3 MG/DL (ref 0.2–1.2)
BUN SERPL-MCNC: 11 MG/DL (ref 6–20)
CALCIUM SERPL-MCNC: 8.9 MG/DL (ref 8.6–10)
CHLORIDE SERPL-SCNC: 106 MMOL/L (ref 98–111)
CO2 SERPL-SCNC: 24 MMOL/L (ref 22–29)
CREAT SERPL-MCNC: 0.6 MG/DL (ref 0.5–0.9)
EOSINOPHIL # BLD: 0.1 K/UL (ref 0–0.6)
EOSINOPHIL NFR BLD: 1.3 % (ref 0–5)
ERYTHROCYTE [DISTWIDTH] IN BLOOD BY AUTOMATED COUNT: 11.9 % (ref 11.5–14.5)
GLUCOSE SERPL-MCNC: 89 MG/DL (ref 70–99)
HCG SERPL QL: NEGATIVE
HCT VFR BLD AUTO: 41.9 % (ref 37–47)
HGB BLD-MCNC: 14.1 G/DL (ref 12–16)
IMM GRANULOCYTES # BLD: 0 K/UL
LIPASE SERPL-CCNC: 17 U/L (ref 13–60)
LYMPHOCYTES # BLD: 2.8 K/UL (ref 1.1–4.5)
LYMPHOCYTES NFR BLD: 32.9 % (ref 20–40)
MCH RBC QN AUTO: 29.2 PG (ref 27–31)
MCHC RBC AUTO-ENTMCNC: 33.7 G/DL (ref 33–37)
MCV RBC AUTO: 86.7 FL (ref 81–99)
MONOCYTES # BLD: 0.6 K/UL (ref 0–0.9)
MONOCYTES NFR BLD: 7 % (ref 0–10)
NEUTROPHILS # BLD: 4.9 K/UL (ref 1.5–7.5)
NEUTS SEG NFR BLD: 58.4 % (ref 50–65)
PLATELET # BLD AUTO: 282 K/UL (ref 130–400)
PMV BLD AUTO: 10.4 FL (ref 9.4–12.3)
POTASSIUM SERPL-SCNC: 3.9 MMOL/L (ref 3.5–5)
PROT SERPL-MCNC: 6.7 G/DL (ref 6.4–8.3)
RBC # BLD AUTO: 4.83 M/UL (ref 4.2–5.4)
SODIUM SERPL-SCNC: 139 MMOL/L (ref 136–145)
WBC # BLD AUTO: 8.4 K/UL (ref 4.8–10.8)

## 2024-12-05 PROCEDURE — 2580000003 HC RX 258: Performed by: EMERGENCY MEDICINE

## 2024-12-05 PROCEDURE — 6360000002 HC RX W HCPCS: Performed by: EMERGENCY MEDICINE

## 2024-12-05 PROCEDURE — 99284 EMERGENCY DEPT VISIT MOD MDM: CPT

## 2024-12-05 PROCEDURE — 84703 CHORIONIC GONADOTROPIN ASSAY: CPT

## 2024-12-05 PROCEDURE — 85025 COMPLETE CBC W/AUTO DIFF WBC: CPT

## 2024-12-05 PROCEDURE — 36415 COLL VENOUS BLD VENIPUNCTURE: CPT

## 2024-12-05 PROCEDURE — 83690 ASSAY OF LIPASE: CPT

## 2024-12-05 PROCEDURE — 80053 COMPREHEN METABOLIC PANEL: CPT

## 2024-12-05 PROCEDURE — 96374 THER/PROPH/DIAG INJ IV PUSH: CPT

## 2024-12-05 RX ORDER — 0.9 % SODIUM CHLORIDE 0.9 %
1000 INTRAVENOUS SOLUTION INTRAVENOUS ONCE
Status: COMPLETED | OUTPATIENT
Start: 2024-12-05 | End: 2024-12-05

## 2024-12-05 RX ORDER — PROMETHAZINE HYDROCHLORIDE 25 MG/1
25 TABLET ORAL EVERY 6 HOURS PRN
Qty: 15 TABLET | Refills: 0 | Status: SHIPPED | OUTPATIENT
Start: 2024-12-05 | End: 2024-12-12

## 2024-12-05 RX ORDER — ONDANSETRON 4 MG/1
4 TABLET, ORALLY DISINTEGRATING ORAL 3 TIMES DAILY PRN
Qty: 21 TABLET | Refills: 0 | Status: SHIPPED | OUTPATIENT
Start: 2024-12-05

## 2024-12-05 RX ORDER — ONDANSETRON 2 MG/ML
4 INJECTION INTRAMUSCULAR; INTRAVENOUS ONCE
Status: COMPLETED | OUTPATIENT
Start: 2024-12-05 | End: 2024-12-05

## 2024-12-05 RX ADMIN — SODIUM CHLORIDE 1000 ML: 9 INJECTION, SOLUTION INTRAVENOUS at 20:56

## 2024-12-05 RX ADMIN — ONDANSETRON 4 MG: 2 INJECTION INTRAMUSCULAR; INTRAVENOUS at 20:55

## 2024-12-05 ASSESSMENT — ENCOUNTER SYMPTOMS
DIARRHEA: 1
ABDOMINAL PAIN: 0
VOMITING: 1
SORE THROAT: 0
SHORTNESS OF BREATH: 0
BACK PAIN: 0
COUGH: 0
RHINORRHEA: 0
NAUSEA: 1

## 2024-12-06 NOTE — ED PROVIDER NOTES
[facility] EMERGENCY DEPT  eMERGENCY dEPARTMENT eNCOUnter      Pt Name: Nhung German  MRN: [id number]  Birthdate 1995  Date of evaluation: 12/5/2024  Provider: JERONIMO ARDON MD    CHIEF COMPLAINT       Chief Complaint   Patient presents with    Nausea & Vomiting     With diarrhea.          HISTORY OF PRESENT ILLNESS   (Location/Symptom, Timing/Onset,Context/Setting, Quality, Duration, Modifying Factors, Severity)  Note limiting factors.   Nhung German is a 29 y.o. female who presents to the emergency department for a 2-day history of nausea vomiting and diarrhea.  States that she had a coworker sick with similar symptoms as she likely got ill from.  She denies any abdominal pain.  Reports subjective fevers and chills.  Concerned she is dehydrated due to decreased by mouth intake.  Has used Zofran and Imodium at home without much relief.  No UTI symptoms.    HPI    NursingNotes were reviewed.    REVIEW OF SYSTEMS    (2-9 systems for level 4, 10 or more for level 5)     Review of Systems   Constitutional:  Positive for chills and fever.   HENT:  Negative for rhinorrhea and sore throat.    Respiratory:  Negative for cough and shortness of breath.    Cardiovascular:  Negative for chest pain.   Gastrointestinal:  Positive for diarrhea, nausea and vomiting. Negative for abdominal pain.   Genitourinary:  Negative for dysuria and frequency.   Musculoskeletal:  Negative for back pain and neck pain.   Neurological:  Negative for dizziness and headaches.            PAST MEDICALHISTORY     Past Medical History:   Diagnosis Date    Anxiety     Depression     Substance abuse (HCC)          SURGICAL HISTORY       Past Surgical History:   Procedure Laterality Date    CAST APPLICATION Left 10/13/2017    LEFT LOWER SHORT LEG SPLINT performed by Sheldon Storey MD at [facility] OR    KNEE SURGERY      [place] OPEN TREATMENT RADIAL SHAFT FRACTURE Right 10/13/2017    RADIUS OPEN REDUCTION INTERNAL FIXATION performed by Sheldon Storey MD at [facility] OR     Plain radiographic images are visualized and preliminarily interpreted bythe emergency physician with the below findings:          No orders to display           LABS:  Labs Reviewed   CBC WITH AUTO DIFFERENTIAL   COMPREHENSIVE METABOLIC PANEL   LIPASE   HCG, SERUM, QUALITATIVE       All other labs were within normal range or not returned as of this dictation.    EMERGENCY DEPARTMENT COURSE and DIFFERENTIAL DIAGNOSIS/MDM:   Vitals:    Vitals:    12/05/24 1842 12/05/24 1843   BP:  (!) 144/108   Pulse: 94    Resp: 18    Temp: 97.7 °F (36.5 °C)    SpO2: 100%        MDM     Amount and/or Complexity of Data Reviewed  Clinical lab tests: ordered and reviewed      VSS afebrile n/v/d with benign abd exam and hx of friend/co worker with same symptoms.  Reassuring work up.  No indication for imaging tonight.  Suspect enteritis.  Bry PO here.  Pt stable for DC and outpt follow up.  Understands return precautions.      CONSULTS:  None    :  Unless otherwise noted below, none     Procedures    FINAL IMPRESSION      1. Nausea vomiting and diarrhea          DISPOSITION/PLAN   DISPOSITION Decision To Discharge 12/05/2024 09:23:56 PM   DISPOSITION CONDITION Stable           PATIENT REFERRED TO:  Fanta Moore, APRN - CNP  [address] [facility] [address]  [phone]    Schedule an appointment as soon as possible for a visit         DISCHARGE MEDICATIONS:  New Prescriptions    ONDANSETRON (ZOFRAN-ODT) 4 MG DISINTEGRATING TABLET    Take 1 tablet by mouth 3 times daily as needed for Nausea or Vomiting    PROMETHAZINE (PHENERGAN) 25 MG TABLET    Take 1 tablet by mouth every 6 hours as needed for Nausea          (Please note that portions of this note were completed with a voice recognition program.  Efforts were made to edit thedictations but occasionally words are mis-transcribed.)    JERONIMO ARDON MD (electronically signed)Emergency Physician        Jeronimo Ardon MD  12/05/24 0209

## 2025-01-14 ENCOUNTER — OFFICE VISIT (OUTPATIENT)
Dept: OBSTETRICS AND GYNECOLOGY | Age: 30
End: 2025-01-14
Payer: COMMERCIAL

## 2025-01-14 VITALS
DIASTOLIC BLOOD PRESSURE: 94 MMHG | WEIGHT: 146 LBS | SYSTOLIC BLOOD PRESSURE: 134 MMHG | HEIGHT: 63 IN | BODY MASS INDEX: 25.87 KG/M2

## 2025-01-14 DIAGNOSIS — Z31.9 INFERTILITY MANAGEMENT: ICD-10-CM

## 2025-01-14 DIAGNOSIS — Z30.09 FAMILY PLANNING: Primary | ICD-10-CM

## 2025-01-14 DIAGNOSIS — Z11.3 SCREEN FOR STD (SEXUALLY TRANSMITTED DISEASE): ICD-10-CM

## 2025-01-14 RX ORDER — SPIRONOLACTONE 50 MG/1
50 TABLET, FILM COATED ORAL DAILY
COMMUNITY

## 2025-01-14 NOTE — PROGRESS NOTES
"Chief Complaint  Gynecologic Exam (Pt is here wanting to discuss family planning.  Pt is planning to start IVF with [facility] and needing all the pre-testing that they are requiring before she can start.  Pt has consultation with them on Jan 20th.  )  History of Present Illness  The patient presents for evaluation of infertility.    She has a history of tubal ligation and is currently seeking to conceive again. She has not yet consulted with a fertility clinic but has an upcoming appointment scheduled. The clinic has requested pretesting, including egg retrieval and implantation, which will necessitate travel. All prenatal care will be managed locally. She is aware of the clinic's reputation for rooney and cost-effective services, which is a significant consideration given her financial constraints. She has been advised to bring all relevant medical records to her consultation.     She has not undergone a Pap smear. She recently started her menstrual cycle.       Subjective          Tamara ARCOS presents to [facility]  History of Present Illness    Review of Systems   Constitutional:         Desires fertility but had a tubal         Objective   Vital Signs:   /94   Ht 160 cm (63\")   Wt 66.2 kg (146 lb)   BMI 25.86 kg/m²     Physical Exam  Vitals reviewed.   Constitutional:       Appearance: She is well-developed.   Eyes:      General:         Right eye: No discharge.         Left eye: No discharge.   Cardiovascular:      Rate and Rhythm: Normal rate and regular rhythm.   Pulmonary:      Effort: Pulmonary effort is normal.      Breath sounds: Normal breath sounds.   Skin:     General: Skin is warm.   Neurological:      Mental Status: She is alert and oriented to person, place, and time.   Psychiatric:         Behavior: Behavior normal.         Thought Content: Thought content normal.         Judgment: Judgment normal.       Physical Exam      Result Review :   The following " "data was reviewed by: KRISTEN Cui on 01/14/2025:  OBGYN Diagnostics Review:   Lab Results   Component Value Date    CASEREPORT  06/20/2022     Surgical Pathology Report                         Case: [id number]                                  Authorizing Provider:  Cehlsey Vicente DO  Collected:           06/20/2022 01:38 PM          Ordering Location:     [facility] OR  Received:            06/20/2022 02:07 PM          Pathologist:           Gumaro Almanza MD                                                        Specimen:    Fallopian Tubes, Bilateral, LEFT AND RIGHT FALLOPIAN TUBES                                 INTERPGYN Negative for intraepithelial lesion or malignancy 10/04/2021    GENCAT Within normal limits 10/04/2021    SPECADGYN  10/04/2021     Satisfactory for evaluation, endocervical/transformation zone component absent    ADDINFO  10/04/2021     Disclaimer: Cervical cytology is a screening test primarily for squamous cancer and its precursors and has associated false-negative and false-positive results.  Technologies such as liquid-based preparations may decrease but will not eliminate all false-negative results.  Follow-up of unexplained clinical signs and symptoms is recommended to minimize false-negative results. (The [place] System for Reporting Cervical Cytology: [place], 2015).        No results found for: \"HPVAPTIMA\"              Current Outpatient Medications on File Prior to Visit   Medication Sig    buPROPion XL (WELLBUTRIN XL) 150 MG 24 hr tablet Take 1 tablet by mouth Every Morning.    Fremanezumab-vfrm 225 MG/1.5ML solution auto-injector Inject 225 mg under the skin into the appropriate area as directed Every 30 (Thirty) Days.    lamoTRIgine (LaMICtal) 25 MG tablet Take 1 tablet by mouth Daily.    rizatriptan MLT (MAXALT-MLT) 10 MG disintegrating tablet Place 1 tablet on the tongue As Needed for Migraine.    spironolactone (ALDACTONE) 50 MG tablet " Take 1 tablet by mouth Daily.    omeprazole (priLOSEC) 20 MG capsule Take 1 capsule by mouth Daily. (Patient not taking: Reported on 1/14/2025)    propranolol (INDERAL) 40 MG tablet Take 1 tablet by mouth 2 (Two) Times a Day. (Patient not taking: Reported on 1/14/2025)     No current facility-administered medications on file prior to visit.          Assessment and Plan    Pt will have labs drawn with PCP.       Assessment & Plan  1. Infertility.  Discussed patient's plan for consultation with fertility clinic.  Patient requested labs but has decided to have them drawn with her PCP/workplace.  Patient to return for consultation with MD in office to discuss follow-up from fertility clinic.  Lab orders placed in case patient needs to have them drawn here but will be managed by the fertility clinic.     2.  Health maintenance   Patient to return for annual exam as she declines to move forward today related to current menses.        Diagnoses and all orders for this visit:    1. Family planning (Primary)  -     Prolactin  -     Testosterone  -     Comprehensive Metabolic Panel  -     Antibody Screen  -     ABO / Rh  -     CBC & Differential  -     Hepatitis Panel, Acute  -     Varicella Zoster Antibody, IgG  -     Hemoglobin A1c  -     TSH  -     T4, Free  -     T3, Uptake  -     Vitamin D,25-Hydroxy  -     Antimullerian Hormone (AMH)    2. Infertility management  -     Prolactin  -     Testosterone  -     Comprehensive Metabolic Panel  -     Antibody Screen  -     ABO / Rh  -     CBC & Differential  -     Hepatitis Panel, Acute  -     Varicella Zoster Antibody, IgG  -     Hemoglobin A1c  -     TSH  -     T4, Free  -     T3, Uptake  -     Vitamin D,25-Hydroxy  -     Antimullerian Hormone (AMH)    3. Screen for STD (sexually transmitted disease)  -     RPR Qualitative with Reflex to Quant  -     HIV-1 / O / 2 Ag / Antibody          BMI is >= 25 and <30. (Overweight) The following options were offered after discussion;:  information on healthy weight added to patient's after visit summary        Follow Up   Annual exam in the next 2-6 wks.     Consult with MD r/t fertility after her appt with IVF clinic.     Patient was given instructions and counseling regarding her condition or for health maintenance advice. Please see specific information pulled into the AVS if appropriate.       Patient or patient representative verbalized consent for the use of Ambient Listening during the visit with  KRISTEN Cui for chart documentation. 1/26/2025  17:20 CST

## 2025-01-26 NOTE — PATIENT INSTRUCTIONS

## 2025-01-28 ENCOUNTER — OFFICE VISIT (OUTPATIENT)
Age: 30
End: 2025-01-28
Payer: COMMERCIAL

## 2025-01-28 VITALS
DIASTOLIC BLOOD PRESSURE: 90 MMHG | HEIGHT: 63 IN | BODY MASS INDEX: 24.8 KG/M2 | SYSTOLIC BLOOD PRESSURE: 124 MMHG | WEIGHT: 140 LBS

## 2025-01-28 DIAGNOSIS — Z76.89 ENCOUNTER TO ESTABLISH CARE WITH NEW DOCTOR: Primary | ICD-10-CM

## 2025-01-28 NOTE — PROGRESS NOTES
"Tamara ARCOS is a 29 y.o. female     History of Present Illness  The patient is a 29-year-old female who presents for evaluation of infertility.    She is currently in the process of initiating In Vitro Fertilization (IVF) and has sought consultation with [facility]. She had a laparoscopic salpingectomy after her last pregnancy. She has a history of three vaginal deliveries, with her last pregnancy being complicated by marital issues, leading to a decision she now regrets. Despite this, she expresses a desire to expand her family with a fourth child.  It has been recommended that she undergo evaluation of her uterine cavity as part of the preparation process for IVF.        Visit Vitals  /90 (BP Location: Left arm, Patient Position: Sitting)   Ht 160 cm (63\")   Wt 63.5 kg (140 lb)   LMP 01/14/2025   BMI 24.80 kg/m²       Pleasant female no acute distress  Mood and affect normal  Breathing unlabored    Results        Assessment & Plan  1. Infertility.  She is in the process of starting IVF and has had a consultation with [facility], which [place]. The patient has had a laparoscopic salpingectomy, so there is no need to assess tubal patency, but with return to clinic would like evaluation of her uterine cavity.  Please request either today saline infusion sonohysterogram, or HSG.  We do not perform sonohysterograms at this facility.  As long as infertility clinic is reasonable, an HSG will be ordered to evaluate the uterine cavity.  She will call when her next cycle begins so that we can arrange the HSG.  Communication with the IVF clinic will be maintained to ensure all necessary information is relayed promptly. Labs and ultrasounds will be conducted locally to support the IVF process.             Patient or patient representative verbalized consent for the use of Ambient Listening during the visit with  Ivan Hernandez MD for chart documentation. 1/28/2025  10:28 CST   "

## 2025-02-03 ENCOUNTER — PATIENT MESSAGE (OUTPATIENT)
Age: 30
End: 2025-02-03
Payer: COMMERCIAL

## 2025-02-03 DIAGNOSIS — N97.9 FEMALE INFERTILITY: Primary | ICD-10-CM

## 2025-02-11 ENCOUNTER — OFFICE VISIT (OUTPATIENT)
Age: 30
End: 2025-02-11
Payer: COMMERCIAL

## 2025-02-11 VITALS
BODY MASS INDEX: 25.52 KG/M2 | DIASTOLIC BLOOD PRESSURE: 84 MMHG | SYSTOLIC BLOOD PRESSURE: 122 MMHG | HEIGHT: 63 IN | WEIGHT: 144 LBS

## 2025-02-11 DIAGNOSIS — N97.1 INFERTILITY, TUBAL ORIGIN: ICD-10-CM

## 2025-02-11 DIAGNOSIS — N97.9 FEMALE INFERTILITY: Primary | ICD-10-CM

## 2025-02-11 PROCEDURE — 99213 OFFICE O/P EST LOW 20 MIN: CPT | Performed by: OBSTETRICS & GYNECOLOGY

## 2025-02-11 NOTE — PROGRESS NOTES
"Tamara ARCOS is a 29 y.o. female here today for follow-up of tubal factor infertility.  She was planning on going through IVF in the near future.  She has an HSG scheduled in the near future for evaluation of the uterine cavity.  She is taking a prenatal vitamin.  Semen analysis has been completed.    Visit Vitals  /84 (BP Location: Left arm, Patient Position: Sitting)   Ht 160 cm (63\")   Wt 65.3 kg (144 lb)   LMP 01/14/2025   BMI 25.51 kg/m²     Pleasant female no acute distress  Mood and affect normal  Breathing unlabored     Semen analysis x 2 reviewed showing total motile count of 8 million    Assessment: Tubal factor infertility    I have reviewed her medication list, and I recommend that she stop spironolactone prior to pregnancy.  Although the semen analysis shows no results, it should be fine to be used for IVF.  Semen analysis results have already been sent to her IVF facility.  We will forward HSG results once they return.  We discussed that we can help with ultrasound exams and lab draws throughout the IVF process.  Follow-up here as needed.  Call with questions or concerns.        "

## 2025-02-21 ENCOUNTER — HOSPITAL ENCOUNTER (OUTPATIENT)
Dept: GENERAL RADIOLOGY | Facility: HOSPITAL | Age: 30
Discharge: HOME | End: 2025-02-21
Payer: COMMERCIAL

## 2025-02-21 PROCEDURE — 74740 X-RAY FEMALE GENITAL TRACT: CPT

## 2025-02-21 PROCEDURE — 25510000001 IOPAMIDOL 61 % SOLUTION: Performed by: RADIOLOGY

## 2025-02-21 RX ORDER — IOPAMIDOL 612 MG/ML
10 INJECTION, SOLUTION INTRATHECAL
Status: COMPLETED | OUTPATIENT
Start: 2025-02-21 | End: 2025-02-21

## 2025-02-21 RX ADMIN — IOPAMIDOL 10 ML: 612 INJECTION, SOLUTION INTRATHECAL at 12:55

## 2025-03-12 ENCOUNTER — PATIENT MESSAGE (OUTPATIENT)
Age: 30
End: 2025-03-12
Payer: COMMERCIAL

## 2025-03-21 RX ORDER — MEDROXYPROGESTERONE ACETATE 10 MG
10 TABLET ORAL DAILY
Qty: 7 TABLET | Refills: 0 | Status: SHIPPED | OUTPATIENT
Start: 2025-03-21 | End: 2025-03-28

## 2025-03-21 NOTE — TELEPHONE ENCOUNTER
Spoke with Dr Hernandez by phone and okay to send provera 10mg x7 days to induce period. Pt is CD 37 with neg UPT. She sees [facility] and we are assisting with labs and imaging here. Pt informed via my chart message that rx has been sent.

## 2025-04-17 PROCEDURE — 87205 SMEAR GRAM STAIN: CPT

## 2025-04-17 PROCEDURE — 87070 CULTURE OTHR SPECIMN AEROBIC: CPT

## 2025-04-17 PROCEDURE — 87147 CULTURE TYPE IMMUNOLOGIC: CPT

## 2025-07-23 ENCOUNTER — HOSPITAL ENCOUNTER (EMERGENCY)
Facility: HOSPITAL | Age: 30
Discharge: HOME | End: 2025-07-23
Admitting: FAMILY MEDICINE
Payer: COMMERCIAL

## 2025-07-23 ENCOUNTER — APPOINTMENT (OUTPATIENT)
Dept: CT IMAGING | Facility: HOSPITAL | Age: 30
End: 2025-07-23
Payer: COMMERCIAL

## 2025-07-23 VITALS
RESPIRATION RATE: 18 BRPM | SYSTOLIC BLOOD PRESSURE: 120 MMHG | TEMPERATURE: 97.3 F | HEART RATE: 92 BPM | HEIGHT: 63 IN | BODY MASS INDEX: 24.19 KG/M2 | DIASTOLIC BLOOD PRESSURE: 85 MMHG | WEIGHT: 136.5 LBS | OXYGEN SATURATION: 99 %

## 2025-07-23 DIAGNOSIS — B34.9 VIRAL ILLNESS: Primary | ICD-10-CM

## 2025-07-23 DIAGNOSIS — S39.012A STRAIN OF LUMBAR REGION, INITIAL ENCOUNTER: ICD-10-CM

## 2025-07-23 LAB
ALBUMIN SERPL-MCNC: 4.8 G/DL (ref 3.5–5.2)
ALBUMIN/GLOB SERPL: 1.9 G/DL
ALP SERPL-CCNC: 70 U/L (ref 39–117)
ALT SERPL W P-5'-P-CCNC: 12 U/L (ref 1–33)
ANION GAP SERPL CALCULATED.3IONS-SCNC: 13 MMOL/L (ref 5–15)
AST SERPL-CCNC: 17 U/L (ref 1–32)
B PARAPERT DNA SPEC QL NAA+PROBE: NOT DETECTED
B PERT DNA SPEC QL NAA+PROBE: NOT DETECTED
B-HCG UR QL: NEGATIVE
BASOPHILS # BLD AUTO: 0.02 10*3/MM3 (ref 0–0.2)
BASOPHILS NFR BLD AUTO: 0.4 % (ref 0–1.5)
BILIRUB SERPL-MCNC: 0.7 MG/DL (ref 0–1.2)
BILIRUB UR QL STRIP: NEGATIVE
BUN SERPL-MCNC: 5.9 MG/DL (ref 6–20)
BUN/CREAT SERPL: 9.2 (ref 7–25)
C PNEUM DNA NPH QL NAA+NON-PROBE: NOT DETECTED
CALCIUM SPEC-SCNC: 9.5 MG/DL (ref 8.6–10.5)
CHLORIDE SERPL-SCNC: 102 MMOL/L (ref 98–107)
CLARITY UR: CLEAR
CO2 SERPL-SCNC: 23 MMOL/L (ref 22–29)
COLOR UR: ABNORMAL
CREAT SERPL-MCNC: 0.64 MG/DL (ref 0.57–1)
DEPRECATED RDW RBC AUTO: 36.2 FL (ref 37–54)
EGFRCR SERPLBLD CKD-EPI 2021: 124.4 ML/MIN/1.73
EOSINOPHIL # BLD AUTO: 0 10*3/MM3 (ref 0–0.4)
EOSINOPHIL NFR BLD AUTO: 0 % (ref 0.3–6.2)
ERYTHROCYTE [DISTWIDTH] IN BLOOD BY AUTOMATED COUNT: 11.9 % (ref 12.3–15.4)
FLUAV SUBTYP SPEC NAA+PROBE: NOT DETECTED
FLUBV RNA NPH QL NAA+NON-PROBE: NOT DETECTED
GLOBULIN UR ELPH-MCNC: 2.5 GM/DL
GLUCOSE SERPL-MCNC: 90 MG/DL (ref 65–99)
GLUCOSE UR STRIP-MCNC: NEGATIVE MG/DL
HADV DNA SPEC NAA+PROBE: NOT DETECTED
HCOV 229E RNA SPEC QL NAA+PROBE: NOT DETECTED
HCOV HKU1 RNA SPEC QL NAA+PROBE: NOT DETECTED
HCOV NL63 RNA SPEC QL NAA+PROBE: NOT DETECTED
HCOV OC43 RNA SPEC QL NAA+PROBE: NOT DETECTED
HCT VFR BLD AUTO: 41.5 % (ref 34–46.6)
HGB BLD-MCNC: 14.2 G/DL (ref 12–15.9)
HGB UR QL STRIP.AUTO: NEGATIVE
HMPV RNA NPH QL NAA+NON-PROBE: NOT DETECTED
HPIV1 RNA ISLT QL NAA+PROBE: NOT DETECTED
HPIV2 RNA SPEC QL NAA+PROBE: NOT DETECTED
HPIV3 RNA NPH QL NAA+PROBE: NOT DETECTED
HPIV4 P GENE NPH QL NAA+PROBE: NOT DETECTED
IMM GRANULOCYTES # BLD AUTO: 0.02 10*3/MM3 (ref 0–0.05)
IMM GRANULOCYTES NFR BLD AUTO: 0.4 % (ref 0–0.5)
KETONES UR QL STRIP: ABNORMAL
LEUKOCYTE ESTERASE UR QL STRIP.AUTO: NEGATIVE
LIPASE SERPL-CCNC: 21 U/L (ref 13–60)
LYMPHOCYTES # BLD AUTO: 1.01 10*3/MM3 (ref 0.7–3.1)
LYMPHOCYTES NFR BLD AUTO: 21.6 % (ref 19.6–45.3)
M PNEUMO IGG SER IA-ACNC: NOT DETECTED
MCH RBC QN AUTO: 29 PG (ref 26.6–33)
MCHC RBC AUTO-ENTMCNC: 34.2 G/DL (ref 31.5–35.7)
MCV RBC AUTO: 84.9 FL (ref 79–97)
MONOCYTES # BLD AUTO: 0.53 10*3/MM3 (ref 0.1–0.9)
MONOCYTES NFR BLD AUTO: 11.3 % (ref 5–12)
NEUTROPHILS NFR BLD AUTO: 3.1 10*3/MM3 (ref 1.7–7)
NEUTROPHILS NFR BLD AUTO: 66.3 % (ref 42.7–76)
NITRITE UR QL STRIP: NEGATIVE
NRBC BLD AUTO-RTO: 0 /100 WBC (ref 0–0.2)
PH UR STRIP.AUTO: 5.5 [PH] (ref 5–8)
PLATELET # BLD AUTO: 245 10*3/MM3 (ref 140–450)
PMV BLD AUTO: 10.5 FL (ref 6–12)
POTASSIUM SERPL-SCNC: 3.6 MMOL/L (ref 3.5–5.2)
PROT SERPL-MCNC: 7.3 G/DL (ref 6–8.5)
PROT UR QL STRIP: ABNORMAL
RBC # BLD AUTO: 4.89 10*6/MM3 (ref 3.77–5.28)
RHINOVIRUS RNA SPEC NAA+PROBE: NOT DETECTED
RSV RNA NPH QL NAA+NON-PROBE: NOT DETECTED
SARS-COV-2 RNA RESP QL NAA+PROBE: NOT DETECTED
SODIUM SERPL-SCNC: 138 MMOL/L (ref 136–145)
SP GR UR STRIP: 1.02 (ref 1–1.03)
UROBILINOGEN UR QL STRIP: ABNORMAL
WBC NRBC COR # BLD AUTO: 4.68 10*3/MM3 (ref 3.4–10.8)

## 2025-07-23 PROCEDURE — 80053 COMPREHEN METABOLIC PANEL: CPT | Performed by: NURSE PRACTITIONER

## 2025-07-23 PROCEDURE — 99284 EMERGENCY DEPT VISIT MOD MDM: CPT

## 2025-07-23 PROCEDURE — 0202U NFCT DS 22 TRGT SARS-COV-2: CPT | Performed by: NURSE PRACTITIONER

## 2025-07-23 PROCEDURE — 36415 COLL VENOUS BLD VENIPUNCTURE: CPT

## 2025-07-23 PROCEDURE — 81025 URINE PREGNANCY TEST: CPT | Performed by: NURSE PRACTITIONER

## 2025-07-23 PROCEDURE — 85025 COMPLETE CBC W/AUTO DIFF WBC: CPT | Performed by: NURSE PRACTITIONER

## 2025-07-23 PROCEDURE — 72131 CT LUMBAR SPINE W/O DYE: CPT

## 2025-07-23 PROCEDURE — 81003 URINALYSIS AUTO W/O SCOPE: CPT | Performed by: NURSE PRACTITIONER

## 2025-07-23 PROCEDURE — 83690 ASSAY OF LIPASE: CPT | Performed by: NURSE PRACTITIONER

## 2025-07-23 RX ORDER — SODIUM CHLORIDE 0.9 % (FLUSH) 0.9 %
10 SYRINGE (ML) INJECTION AS NEEDED
Status: DISCONTINUED | OUTPATIENT
Start: 2025-07-23 | End: 2025-07-23 | Stop reason: HOSPADM

## 2025-07-23 RX ORDER — CYCLOBENZAPRINE HCL 10 MG
10 TABLET ORAL 3 TIMES DAILY PRN
Qty: 15 TABLET | Refills: 0 | Status: SHIPPED | OUTPATIENT
Start: 2025-07-23

## 2025-07-23 NOTE — Clinical Note
[facility] EMERGENCY DEPARTMENT  [address]  [facility] [place] [address]  Phone: [phone]    Tamara ARCOS was seen and treated in our emergency department on 7/23/2025.  She may return to work on 07/28/2025.         Thank you for choosing [facility].    Ashlee Mckeon, APRN

## 2025-07-24 NOTE — ED PROVIDER NOTES
"Subjective   History of Present Illness  Patient is a 29-year-old female who presents to the ER with complaints of back pain.  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  She states that she had worked hard all throughout the weekend and feels that she had overdone it.  She states the pain started in her low back and started radiating to her buttock and somewhat to her hips bilaterally.  Denies any loss of bowel or bladder control or saddle anesthesia.  No obvious injury other than mentioning doing quite a bit of cleaning.  She states she was carrying in several bags of groceries and thought she may have also exacerbated her low back pain.  Patient states she then began developing a fever.  She has alternated Tylenol and Motrin.  She has taken naproxen and took a leftover tizanidine.  That was prescribed for a separate issue sometime ago.  Patient states she has history of kidney stones and started thinking maybe she had a kidney stone.  She denies any dysuria or hematuria.  She denies any cough or congestion.  She states that there is, \"something going around at work\" therefore it could also be more of a viral illness.  She has no abdominal pain.  She denies any sore throat or difficulty swallowing.  She has no dysuria and denies any vaginal discharge.  Due to back pain and now fever she came to the ER for evaluation and treatment.    Past medical history significant for anxiety, BV, chlamydia, depression, dysfunctional uterine bleeding, migraines, ovarian cyst, UTI, vaginitis, kidney stones              Review of Systems   Constitutional:  Positive for fever.   HENT: Negative.  Negative for congestion and sore throat.    Respiratory:  Negative for cough and shortness of breath.    Cardiovascular:  Negative for chest pain.   Gastrointestinal: Negative.  Negative for abdominal pain, constipation, diarrhea, nausea and vomiting. "   Genitourinary:  Positive for flank pain. Negative for dysuria, vaginal bleeding and vaginal discharge.   Musculoskeletal:  Positive for back pain and myalgias.   Skin: Negative.  Negative for rash.       Past Medical History:   Diagnosis Date    Anxiety     BV (bacterial vaginosis)     Chlamydia     Contraception management     Depression     DUB (dysfunctional uterine bleeding)     Exposure to STD     Migraines     Nausea & vomiting     Ovarian cyst     Screen for STD (sexually transmitted disease)     Supervision of normal first pregnancy     Tobacco use     UTI (urinary tract infection)     Vaginitis     Vaginitis due to Candida      Allergies   Allergen Reactions    Peanut-Containing Drug Products Swelling       Past Surgical History:   Procedure Laterality Date    ABDOMINAL SURGERY  2023    tummy tuck    ADENOIDECTOMY      KNEE SURGERY Right     PATELLA OPEN REDUCTION INTERNAL FIXATION Right 2017    Procedure: Irrigation and debridement of right knee - removal of contamination, possible arthrotomy (c-arm);  Surgeon: Tello Davenport MD;  Location:  [facility] OR;  Service:     SALPINGECTOMY Bilateral 2022    Procedure: SALPINGECTOMY LAPAROSCOPIC;  Surgeon: Chelsey Vicente DO;  Location:  [facility] OR;  Service: Obstetrics/Gynecology;  Laterality: Bilateral;    TONSILLECTOMY      WISDOM TOOTH EXTRACTION      WRIST SURGERY Right        Family History   Problem Relation Age of Onset    Hypertension Mother     Coronary artery disease Father        Social History     Socioeconomic History    Marital status:    Tobacco Use    Smoking status: Former     Current packs/day: 0.00     Types: Cigarettes     Quit date:      Years since quittin.5     Passive exposure: Past    Smokeless tobacco: Never    Tobacco comments:     QUIT 1 MONTH SMOKING   Vaping Use    Vaping status: Every Day    Substances: Nicotine, Flavoring    Devices: Disposable, vaping    Passive vaping exposure: Yes    Substance and Sexual Activity    Alcohol use: Not Currently    Drug use: No    Sexual activity: Yes     Partners: Male     Birth control/protection: None, Bilateral salpingectomy            Objective   Physical Exam  Vitals and nursing note reviewed.   Constitutional:       Appearance: She is well-developed.      Comments: Evaluated in the hallway, patient is nontoxic-appearing, no distress on exam   HENT:      Head: Normocephalic and atraumatic.      Right Ear: External ear normal.      Left Ear: External ear normal.      Nose: Nose normal.      Mouth/Throat:      Mouth: Mucous membranes are moist.      Pharynx: Oropharynx is clear.   Eyes:      Extraocular Movements: Extraocular movements intact.      Conjunctiva/sclera: Conjunctivae normal.   Cardiovascular:      Rate and Rhythm: Normal rate and regular rhythm.      Heart sounds: Normal heart sounds.   Pulmonary:      Effort: Pulmonary effort is normal.      Breath sounds: Normal breath sounds.   Abdominal:      General: Bowel sounds are normal. There is no distension.      Palpations: Abdomen is soft.      Tenderness: There is no abdominal tenderness.      Comments: No abdominal pain noted to palpation on exam, bowel sounds noted   Musculoskeletal:         General: Tenderness present. Normal range of motion.      Cervical back: Normal range of motion and neck supple.      Comments: Pain to palpation of the lumbar spine in particular the soft tissues on either side of the lumbar spine, reports radiating pain to her buttock into her hips bilaterally, range of motion intact, motor strength intact, she has no step-off or vertebral point tenderness noted, neurologically intact denying any loss of bowel or bladder control or saddle anesthesia, no weakness   Skin:     General: Skin is warm and dry.   Neurological:      Mental Status: She is alert and oriented to person, place, and time.   Psychiatric:         Mood and Affect: Mood normal.         Behavior: Behavior  "normal.         Thought Content: Thought content normal.         Judgment: Judgment normal.         Procedures           ED Course  ED Course as of 07/24/25 0931   Wed Jul 23, 2025 2044 Something going around at work [TW]      ED Course User Index  [TW] Ashlee Mckeon APRN                                                       Medical Decision Making  Patient is a 29-year-old female who presents to the ER with complaints of back pain.  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  She states that she had worked hard all throughout the weekend and feels that she had overdone it.  She states the pain started in her low back and started radiating to her buttock and somewhat to her hips bilaterally.  Denies any loss of bowel or bladder control or saddle anesthesia.  No obvious injury other than mentioning doing quite a bit of cleaning.  She states she was carrying in several bags of groceries and thought she may have also exacerbated her low back pain.  Patient states she then began developing a fever.  She has alternated Tylenol and Motrin.  She has taken naproxen and took a leftover tizanidine.  That was prescribed for a separate issue sometime ago.  Patient states she has history of kidney stones and started thinking maybe she had a kidney stone.  She denies any dysuria or hematuria.  She denies any cough or congestion.  She states that there is, \"something going around at work\" therefore it could also be more of a viral illness.  She has no abdominal pain.  She denies any sore throat or difficulty swallowing.  She has no dysuria and denies any vaginal discharge.  Due to back pain and now fever she came to the ER for evaluation and treatment.    Past medical history significant for anxiety, BV, chlamydia, depression, dysfunctional uterine bleeding, migraines, ovarian cyst, UTI, vaginitis, kidney stones    Differential diagnosis includes " but not limited to muscle strain, spinous fracture, kidney stone, UTI, pyelonephritis, viral illness, and other etiologies    Problems Addressed:  Strain of lumbar region, initial encounter: complicated acute illness or injury  Viral illness: complicated acute illness or injury    Amount and/or Complexity of Data Reviewed  Labs: ordered.  Radiology: ordered.    Risk  Prescription drug management.      Labs Reviewed   COMPREHENSIVE METABOLIC PANEL - Abnormal; Notable for the following components:       Result Value    BUN 5.9 (*)     All other components within normal limits    Narrative:     GFR Categories in Chronic Kidney Disease (CKD)              GFR Category          GFR (mL/min/1.73)    Interpretation  G1                    90 or greater        Normal or high (1)  G2                    60-89                Mild decrease (1)  G3a                   45-59                Mild to moderate decrease  G3b                   30-44                Moderate to severe decrease  G4                    15-29                Severe decrease  G5                    14 or less           Kidney failure    (1)In the absence of evidence of kidney disease, neither GFR category G1 or G2 fulfill the criteria for CKD.    eGFR calculation 2021 CKD-EPI creatinine equation, which does not include race as a factor   URINALYSIS W/ MICROSCOPIC IF INDICATED (NO CULTURE) - Abnormal; Notable for the following components:    Color, UA Dark Yellow (*)     Ketones, UA Trace (*)     Protein, UA Trace (*)     All other components within normal limits    Narrative:     Urine microscopic not indicated.   CBC WITH AUTO DIFFERENTIAL - Abnormal; Notable for the following components:    RDW 11.9 (*)     RDW-SD 36.2 (*)     Eosinophil % 0.0 (*)     All other components within normal limits   RESPIRATORY PANEL PCR W/ COVID-19 (SARS-COV-2), NP SWAB IN UTM/VTP, 2 HR TAT - Normal    Narrative:     In the setting of a positive respiratory panel with a viral  "infection PLUS a negative procalcitonin without other underlying concern for bacterial infection, consider observing off antibiotics or discontinuation of antibiotics and continue supportive care. If the respiratory panel is positive for atypical bacterial infection (Bordetella pertussis, Chlamydophila pneumoniae, or Mycoplasma pneumoniae), consider antibiotic de-escalation to target atypical bacterial infection.   LIPASE - Normal   PREGNANCY, URINE - Normal   CBC AND DIFFERENTIAL    Narrative:     The following orders were created for panel order CBC & Differential.  Procedure                               Abnormality         Status                     ---------                               -----------         ------                     CBC Auto Differential[[id number]]        Abnormal            Final result                 Please view results for these tests on the individual orders.      CT Lumbar Spine Without Contrast   Final Result       No acute osseous finding or significant degenerative changes.           This report was signed and finalized on 7/23/2025 7:30 PM by Angel Gupta.          Patient presents to the emergency department with low back pain and fever.  She states she had been working hard at her home all throughout the weekend and believes she strained a muscle.  She reports radiating pain to her hips bilaterally.  Denies any loss of bowel or bladder control or saddle anesthesia.  She began developing a fever and thought she may have a UTI or a kidney stone.  She denies any urinary symptoms.  She denies any vaginal discharge or recent unprotected intercourse.  She has no abdominal pain on reexam and again denies any vaginal discharge.  She tells me there is \"something going around at work\" and we discussed the possibility of a viral illness that is not showing up on respiratory panel.  CT scan shows no acute findings.  Labs are unremarkable including a normal white blood count and CMP was " unrevealing as well.  Urinalysis shows no infection.  Lipase is normal.  Respiratory panel was negative.  She has had no cough or congestion and lungs were clear on exam, not hypoxic.  Plan at this time is to treat for possible muscle strain with anti-inflammatories and muscle relaxant.  Recommend to be reevaluated by PCP and/or return with any new or worsening symptoms.  She is comfortable with discharge and states that she assumes it is likely a viral thing from work however as described above we did state if she has any new symptoms that may need to be evaluated she needed to return to the emergency department.  She is nontoxic-appearing.  She will be discharged home shortly in stable condition.  Final diagnoses:   Viral illness   Strain of lumbar region, initial encounter       ED Disposition  ED Disposition       ED Disposition   Discharge    Condition   Good    Comment   --               No follow-up provider specified.       Medication List        New Prescriptions      cyclobenzaprine 10 MG tablet  Commonly known as: FLEXERIL  Take 1 tablet by mouth 3 (Three) Times a Day As Needed for Muscle Spasms (back pain).     diclofenac 50 MG EC tablet  Commonly known as: VOLTAREN  Take 1 tablet by mouth 2 (Two) Times a Day As Needed (back pain).               Where to Get Your Medications        These medications were sent to [id number] - [place], [place] - [address] - [phone]  - [phone] FX  [address], [facility] [address]      Phone: [phone]   cyclobenzaprine 10 MG tablet  diclofenac 50 MG EC tablet            Ashlee Mckeon, APRN  07/24/25 0972